# Patient Record
Sex: FEMALE | Race: WHITE | NOT HISPANIC OR LATINO | Employment: UNEMPLOYED | URBAN - METROPOLITAN AREA
[De-identification: names, ages, dates, MRNs, and addresses within clinical notes are randomized per-mention and may not be internally consistent; named-entity substitution may affect disease eponyms.]

---
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
## 2023-03-03 ENCOUNTER — OFFICE VISIT (OUTPATIENT)
Dept: FAMILY MEDICINE CLINIC | Facility: CLINIC | Age: 18
End: 2023-03-03
Payer: COMMERCIAL

## 2023-03-03 VITALS
SYSTOLIC BLOOD PRESSURE: 108 MMHG | HEART RATE: 71 BPM | BODY MASS INDEX: 26.68 KG/M2 | WEIGHT: 145 LBS | DIASTOLIC BLOOD PRESSURE: 65 MMHG | HEIGHT: 62 IN | TEMPERATURE: 98.1 F | OXYGEN SATURATION: 97 %

## 2023-03-03 DIAGNOSIS — Z00.00 ANNUAL PHYSICAL EXAM: Primary | ICD-10-CM

## 2023-03-03 DIAGNOSIS — F41.9 ANXIETY: ICD-10-CM

## 2023-03-03 DIAGNOSIS — F33.1 MODERATE EPISODE OF RECURRENT MAJOR DEPRESSIVE DISORDER: ICD-10-CM

## 2023-03-03 PROCEDURE — 99395 PREV VISIT EST AGE 18-39: CPT | Performed by: NURSE PRACTITIONER

## 2023-03-03 RX ORDER — ESCITALOPRAM OXALATE 10 MG/1
10 TABLET ORAL DAILY
Qty: 90 TABLET | Refills: 3 | Status: SHIPPED | OUTPATIENT
Start: 2023-03-03

## 2023-03-03 RX ORDER — BUSPIRONE HYDROCHLORIDE 5 MG/1
TABLET ORAL
Qty: 60 TABLET | Refills: 5 | Status: SHIPPED | OUTPATIENT
Start: 2023-03-03

## 2023-07-31 ENCOUNTER — TELEPHONE (OUTPATIENT)
Dept: FAMILY MEDICINE CLINIC | Facility: CLINIC | Age: 18
End: 2023-07-31
Payer: COMMERCIAL

## 2023-07-31 ENCOUNTER — PATIENT MESSAGE (OUTPATIENT)
Dept: FAMILY MEDICINE CLINIC | Facility: CLINIC | Age: 18
End: 2023-07-31
Payer: COMMERCIAL

## 2023-07-31 DIAGNOSIS — Z34.90 PREGNANCY, UNSPECIFIED GESTATIONAL AGE: Primary | ICD-10-CM

## 2023-08-04 ENCOUNTER — CLINICAL SUPPORT (OUTPATIENT)
Dept: FAMILY MEDICINE CLINIC | Facility: CLINIC | Age: 18
End: 2023-08-04
Payer: COMMERCIAL

## 2023-08-04 DIAGNOSIS — Z34.90 PREGNANCY, UNSPECIFIED GESTATIONAL AGE: ICD-10-CM

## 2023-08-04 DIAGNOSIS — Z11.59 NEED FOR HEPATITIS C SCREENING TEST: Primary | ICD-10-CM

## 2023-08-04 LAB
25(OH)D3 SERPL-MCNC: 27.9 NG/ML (ref 30–100)
ALBUMIN SERPL-MCNC: 4.8 G/DL (ref 3.5–5.2)
ALBUMIN/GLOB SERPL: 1.7 G/DL
ALP SERPL-CCNC: 44 U/L (ref 39–117)
ALT SERPL W P-5'-P-CCNC: 15 U/L (ref 1–33)
ANION GAP SERPL CALCULATED.3IONS-SCNC: 12 MMOL/L (ref 5–15)
AST SERPL-CCNC: 18 U/L (ref 1–32)
BASOPHILS # BLD AUTO: 0.05 10*3/MM3 (ref 0–0.2)
BASOPHILS NFR BLD AUTO: 0.6 % (ref 0–1.5)
BILIRUB SERPL-MCNC: 0.4 MG/DL (ref 0–1.2)
BUN SERPL-MCNC: 10 MG/DL (ref 6–20)
BUN/CREAT SERPL: 13 (ref 7–25)
CALCIUM SPEC-SCNC: 10.3 MG/DL (ref 8.6–10.5)
CHLORIDE SERPL-SCNC: 103 MMOL/L (ref 98–107)
CHOLEST SERPL-MCNC: 176 MG/DL (ref 0–200)
CO2 SERPL-SCNC: 25 MMOL/L (ref 22–29)
CREAT SERPL-MCNC: 0.77 MG/DL (ref 0.57–1)
DEPRECATED RDW RBC AUTO: 38.6 FL (ref 37–54)
EGFRCR SERPLBLD CKD-EPI 2021: 114.1 ML/MIN/1.73
EOSINOPHIL # BLD AUTO: 0.26 10*3/MM3 (ref 0–0.4)
EOSINOPHIL NFR BLD AUTO: 3.3 % (ref 0.3–6.2)
ERYTHROCYTE [DISTWIDTH] IN BLOOD BY AUTOMATED COUNT: 12 % (ref 12.3–15.4)
FOLATE SERPL-MCNC: 6.74 NG/ML (ref 4.78–24.2)
GLOBULIN UR ELPH-MCNC: 2.9 GM/DL
GLUCOSE SERPL-MCNC: 90 MG/DL (ref 65–99)
HCT VFR BLD AUTO: 43.7 % (ref 34–46.6)
HCV AB SER DONR QL: NORMAL
HDLC SERPL-MCNC: 54 MG/DL (ref 40–60)
HGB BLD-MCNC: 14.6 G/DL (ref 12–15.9)
IMM GRANULOCYTES # BLD AUTO: 0.02 10*3/MM3 (ref 0–0.05)
IMM GRANULOCYTES NFR BLD AUTO: 0.3 % (ref 0–0.5)
LDLC SERPL CALC-MCNC: 109 MG/DL (ref 0–100)
LDLC/HDLC SERPL: 2 {RATIO}
LYMPHOCYTES # BLD AUTO: 2.4 10*3/MM3 (ref 0.7–3.1)
LYMPHOCYTES NFR BLD AUTO: 30.1 % (ref 19.6–45.3)
MCH RBC QN AUTO: 29.7 PG (ref 26.6–33)
MCHC RBC AUTO-ENTMCNC: 33.4 G/DL (ref 31.5–35.7)
MCV RBC AUTO: 88.8 FL (ref 79–97)
MONOCYTES # BLD AUTO: 0.51 10*3/MM3 (ref 0.1–0.9)
MONOCYTES NFR BLD AUTO: 6.4 % (ref 5–12)
NEUTROPHILS NFR BLD AUTO: 4.73 10*3/MM3 (ref 1.7–7)
NEUTROPHILS NFR BLD AUTO: 59.3 % (ref 42.7–76)
NRBC BLD AUTO-RTO: 0 /100 WBC (ref 0–0.2)
PLATELET # BLD AUTO: 418 10*3/MM3 (ref 140–450)
PMV BLD AUTO: 10.1 FL (ref 6–12)
POTASSIUM SERPL-SCNC: 4.3 MMOL/L (ref 3.5–5.2)
PROT SERPL-MCNC: 7.7 G/DL (ref 6–8.5)
RBC # BLD AUTO: 4.92 10*6/MM3 (ref 3.77–5.28)
SODIUM SERPL-SCNC: 140 MMOL/L (ref 136–145)
TRIGL SERPL-MCNC: 69 MG/DL (ref 0–150)
TSH SERPL DL<=0.05 MIU/L-ACNC: 2.17 UIU/ML (ref 0.27–4.2)
VIT B12 BLD-MCNC: 433 PG/ML (ref 211–946)
VLDLC SERPL-MCNC: 13 MG/DL (ref 5–40)
WBC NRBC COR # BLD: 7.97 10*3/MM3 (ref 3.4–10.8)

## 2023-08-04 PROCEDURE — 84443 ASSAY THYROID STIM HORMONE: CPT | Performed by: NURSE PRACTITIONER

## 2023-08-04 PROCEDURE — 80053 COMPREHEN METABOLIC PANEL: CPT | Performed by: NURSE PRACTITIONER

## 2023-08-04 PROCEDURE — 82607 VITAMIN B-12: CPT | Performed by: NURSE PRACTITIONER

## 2023-08-04 PROCEDURE — 82306 VITAMIN D 25 HYDROXY: CPT | Performed by: NURSE PRACTITIONER

## 2023-08-04 PROCEDURE — 85025 COMPLETE CBC W/AUTO DIFF WBC: CPT | Performed by: NURSE PRACTITIONER

## 2023-08-04 PROCEDURE — 80061 LIPID PANEL: CPT | Performed by: NURSE PRACTITIONER

## 2023-08-04 PROCEDURE — 86803 HEPATITIS C AB TEST: CPT | Performed by: NURSE PRACTITIONER

## 2023-08-04 PROCEDURE — 82746 ASSAY OF FOLIC ACID SERUM: CPT | Performed by: NURSE PRACTITIONER

## 2024-06-06 ENCOUNTER — OFFICE VISIT (OUTPATIENT)
Dept: FAMILY MEDICINE CLINIC | Facility: CLINIC | Age: 19
End: 2024-06-06
Payer: COMMERCIAL

## 2024-06-06 VITALS
SYSTOLIC BLOOD PRESSURE: 109 MMHG | DIASTOLIC BLOOD PRESSURE: 72 MMHG | HEART RATE: 69 BPM | TEMPERATURE: 98 F | HEIGHT: 62 IN | BODY MASS INDEX: 29.15 KG/M2 | WEIGHT: 158.4 LBS | OXYGEN SATURATION: 98 %

## 2024-06-06 PROCEDURE — 99213 OFFICE O/P EST LOW 20 MIN: CPT | Performed by: NURSE PRACTITIONER

## 2024-06-06 NOTE — PROGRESS NOTES
Chief Complaint  Anxiety (depress) and Depression    Subjective          Vicki Jeong is a 18 y.o. female who presents to [facility]    Anxiety   Symptoms include nervous/anxious behavior.  Patient reports no chest pain, confusion, dizziness, nausea, palpitations, shortness of breath or suicidal ideas. Her past medical history is significant for depression.   DepressionSymptoms include nervousness/anxiety. Patient is not experiencing: confusion, palpitations, shortness of breath, suicidal ideas, chest pain, dizziness and nausea.  Her past medical history is significant for depression.     History of Present Illness  The patient presents for evaluation of depression.    The patient gave birth to her baby on 05/04/2024 and has been experiencing feelings of sadness since the conclusion of her pregnancy. She has previously responded well to Lexapro and BuSpar, but is uncertain about the appropriateness of continuing them while breastfeeding. Her feelings of sadness and anxiety are heightened due to her partner's lack of support and are planning to move in with in-laws. This has exacerbated her anxiety following the birth of her baby.  The patient's in-laws are continually encouraging supplementation for her baby with formula instead of breastfeeding, as the baby's lack of perceived weight gain has been a challenge.     She is employed at [employer] and is seeking a short-term claim for medication adjustment. Her maternity leave is set f until     06/14/2024. She denies any suicidal or homicidal ideation. She admits to occasional smoking, albeit less frequently than in the past. Her sleep pattern is disrupted, waking up 2 to 3 times per night with the baby  The patient reports a sensation of weakness in her neck.      PHQ-2 Total Score: 6   PHQ-9 Total Score: 6        Review of Systems   Respiratory:  Negative for shortness of breath.    Cardiovascular:  Negative for chest pain and  palpitations.   Gastrointestinal:  Negative for nausea.   Neurological:  Negative for dizziness.   Psychiatric/Behavioral:  Negative for confusion, self-injury, sleep disturbance and suicidal ideas. The patient is nervous/anxious.           Medical History: has a past medical history of Anxiety and Depression.     Surgical History: has a past surgical history that includes [place] tooth extraction (2022).     Family History: family history includes Cancer in her paternal grandfather; Depression in her mother.     Social History: reports that she has never smoked. She has never used smokeless tobacco.    Allergies: Amoxicillin-pot clavulanate and Cephalexin      Health Maintenance Due   Topic Date Due   • Pneumococcal Vaccine 0-64 (1 of 1 - PPSV23 or PCV20) 09/24/2009   • HPV VACCINES (2 - 2-dose series) 12/21/2017   • COVID-19 Vaccine (1 - 2023-24 season) Never done   • ANNUAL PHYSICAL  03/03/2024            Current Outpatient Medications:   •  albuterol sulfate  (90 Base) MCG/ACT inhaler, INHALE 1 TO 2 PUFFS BY MOUTH EVERY 4 TO 6 HOURS AS NEEDED, Disp: 6.7 g, Rfl: 1  •  busPIRone (BUSPAR) 5 MG tablet, qam and noon (Patient not taking: Reported on 6/6/2024), Disp: 60 tablet, Rfl: 5  •  norgestimate-ethinyl estradiol (Tri-Lo-Sprintec) 0.18/0.215/0.25 MG-25 MCG per tablet, Take 1 tablet by mouth Daily. (Patient not taking: Reported on 6/6/2024), Disp: 28 tablet, Rfl: 11  •  sertraline (Zoloft) 50 MG tablet, 0.5 tab po qhs x 7 days then 1 tab qhs thereafter., Disp: 30 tablet, Rfl: 1      Immunization History   Administered Date(s) Administered   • DTaP 2003, 01/27/2004, 03/23/2004, 08/09/2005, 11/19/2007   • DTaP / HiB / IPV 2003, 01/27/2004, 03/23/2004, 01/06/2005   • Hep A, 2 Dose 12/14/2018, 01/22/2020   • Hep B, Adolescent or Pediatric 2003, 2003, 07/13/2004   • Hpv9 06/21/2017   • IPV 2003, 01/27/2004, 08/09/2005, 11/19/2007   • MMR 10/13/2004, 11/19/2007   • Meningococcal  "Conjugate 10/13/2014   • Meningococcal MCV4P (Menactra) 01/22/2020   • Pneumococcal Conjugate 13-Valent (PCV13) 2003, 01/27/2004, 03/23/2004, 01/06/2005   • Tdap 10/13/2014   • Varicella 10/13/2004, 11/19/2007         Objective       Vitals:    06/06/24 1533   BP: 109/72   BP Location: Right arm   Patient Position: Sitting   Cuff Size: Adult   Pulse: 69   Temp: 98 °F (36.7 °C)   TempSrc: Temporal   SpO2: 98%   Weight: 71.8 kg (158 lb 6.4 oz)   Height: 157.5 cm (62\")      Body mass index is 28.97 kg/m².   Wt Readings from Last 3 Encounters:   06/06/24 71.8 kg (158 lb 6.4 oz)   03/03/23 65.8 kg (145 lb) (76%, Z= 0.71)*   03/02/22 58.1 kg (128 lb) (56%, Z= 0.15)*     * Growth percentiles are based on CDC (Girls, 2-20 Years) data.      BP Readings from Last 3 Encounters:   06/06/24 109/72   03/03/23 108/65   03/02/22 117/65        Facility age limit for growth %molina is 20 years.    Physical Exam  Vitals reviewed.   Constitutional:       Appearance: Normal appearance. She is well-developed.   HENT:      Head: Normocephalic and atraumatic.   Eyes:      Conjunctiva/sclera: Conjunctivae normal.      Pupils: Pupils are equal, round, and reactive to light.   Cardiovascular:      Rate and Rhythm: Normal rate and regular rhythm.      Heart sounds: Normal heart sounds. No murmur heard.  Pulmonary:      Effort: Pulmonary effort is normal.      Breath sounds: Normal breath sounds. No wheezing or rhonchi.   Abdominal:      General: Bowel sounds are normal. There is no distension.      Palpations: Abdomen is soft.      Tenderness: There is no abdominal tenderness.   Skin:     General: Skin is warm and dry.   Neurological:      Mental Status: She is alert and oriented to person, place, and time.   Psychiatric:         Mood and Affect: Mood and affect normal.         Behavior: Behavior normal.         Thought Content: Thought content normal.         Judgment: Judgment normal.       Physical Exam        Result Review : "   Results  Testing  Depression score is 6.       Common labs          3/7/2024    14:43 5/3/2024    18:30 5/5/2024    00:00   Common Labs   WBC 16.54     11.19        Hemoglobin 10.4     13.6     11.9       Hematocrit 32.4     40.5     36.7       Platelets 315     260           Details          This result is from an external source.                            Assessment and Plan        Diagnoses and all orders for this visit:    1. Post partum depression (Primary)  -     sertraline (Zoloft) 50 MG tablet; 0.5 tab po qhs x 7 days then 1 tab qhs thereafter.  Dispense: 30 tablet; Refill: 1  -     TSH Rfx On Abnormal To Free T4; Future       Patient was given an SSRI/SNRI medication and warned of possible side effects of the medication including potential for increased risk of suicidal thoughts and feelings.  Patient was instructed that if they begin to exhibit any of these effects that they will discontinue the medication immediately and contact our office or the ER ASAP.    Assessment & Plan  1. Depression.  The patient's depression score is recorded at 6, indicating moderate depression. The patient was informed that Lexapro is not the most suitable alternative for breastfeeding. A prescription for Zoloft, to be taken at bedtime, was issued, which is deemed safe in breastfeeding. A follow-up appointment is scheduled for 30 days hence to assess the patient's response to the Zoloft treatment. The patient was advised to report any suicidal or homicidal ideation while on Zoloft. Given the patient's history of postpartum anxiety/depression and her mother's history of hypothyroidism, a thyroid check was recommended.    Follow Up     Return in about 4 weeks (around 7/4/2024) for Next scheduled follow up.    Patient was given instructions and counseling regarding her condition or for health maintenance advice. Please see specific information pulled into the AVS if appropriate.     ANDREA Montiel    Patient or patient  representative verbalized consent for the use of Ambient Listening during the visit with  ANDREA Montiel for chart documentation. 6/6/2024  16:05 EDT

## 2024-06-25 NOTE — PROGRESS NOTES
Chief Complaint    Annual Exam  Annual Exam and Depression (Follow up)    Subjective          Vicki Jeong is a 18 y.o. female who presents to [facility]    Annual Exam    History of Present Illness  The patient presents for evaluation of multiple medical concerns.    She gave birth to a baby who weighed 6 pounds 13 ounces.   Her last dental exam was over a year ago and her last eye exam was last year.   During her last visit, she was prescribed Zoloft for anxiety, which she tolerates well. She reports that the medication has somewhat reduced her anxiety, but she continues to experience significant anxiety. She is seeking an additional month's leave from work.     She was prescribed Tri-Sprintec by her doctor, but her insurance did not cover it, leading her to switch to [employer]. The Tri-Sprintec was refilled by this provider. She is concerned about safety of taking Sprintec while breastfeeding. She has only menstruated once since giving birth due to stress related to moving in with her in laws with her new baby.      PHQ-2 Total Score:     PHQ-9 Total Score:          Review of Systems   Constitutional:  Negative for fever.   Respiratory:  Negative for chest tightness and shortness of breath.    Cardiovascular:  Negative for chest pain and palpitations.   Psychiatric/Behavioral:  The patient is nervous/anxious.           Medical History: has a past medical history of Anxiety and Depression.     Surgical History: has a past surgical history that includes [place] tooth extraction (2022).     Family History: family history includes Cancer in her paternal grandfather; Depression in her mother.     Social History: reports that she has never smoked. She has never used smokeless tobacco.    Allergies: Amoxicillin-pot clavulanate, Cephalexin, and Latex      Health Maintenance Due   Topic Date Due    HPV VACCINES (2 - 2-dose series) 12/21/2017    COVID-19 Vaccine (1 - 2023-24 season) Never  "done    ANNUAL PHYSICAL  03/03/2024            Current Outpatient Medications:     sertraline (Zoloft) 100 MG tablet, Take 1 tablet by mouth Daily. 0.5 tab po qhs x 7 days then 1 tab qhs thereafter., Disp: 90 tablet, Rfl: 1    norethindrone (Ortho Micronor) 0.35 MG tablet, Take 1 tablet by mouth Daily., Disp: 28 tablet, Rfl: 12      Immunization History   Administered Date(s) Administered    DTaP 2003, 01/27/2004, 03/23/2004, 08/09/2005, 11/19/2007    DTaP / HiB / IPV 2003, 01/27/2004, 03/23/2004, 01/06/2005    Hep A, 2 Dose 12/14/2018, 01/22/2020    Hep B, Adolescent or Pediatric 2003, 2003, 07/13/2004    Hpv9 06/21/2017    IPV 2003, 01/27/2004, 08/09/2005, 11/19/2007    MMR 10/13/2004, 11/19/2007    Meningococcal Conjugate 10/13/2014    Meningococcal MCV4P (Menactra) 01/22/2020    Pneumococcal Conjugate 13-Valent (PCV13) 2003, 01/27/2004, 03/23/2004, 01/06/2005    Tdap 10/13/2014    Varicella 10/13/2004, 11/19/2007         Objective       Vitals:    07/11/24 0801   BP: 103/71   Pulse: 91   Temp: 98.5 °F (36.9 °C)   TempSrc: Temporal   SpO2: 98%   Weight: 71.7 kg (158 lb)   Height: 157.5 cm (62.01\")      Body mass index is 28.89 kg/m².   Wt Readings from Last 3 Encounters:   07/11/24 71.7 kg (158 lb)   06/06/24 71.8 kg (158 lb 6.4 oz)   03/03/23 65.8 kg (145 lb) (76%, Z= 0.71)*     * Growth percentiles are based on [facility] (Girls, 2-20 Years) data.      BP Readings from Last 3 Encounters:   07/11/24 103/71   06/06/24 109/72   03/03/23 108/65                 Physical Exam  Vitals reviewed.   Constitutional:       Appearance: Normal appearance. She is well-developed.   HENT:      Head: Normocephalic and atraumatic.   Eyes:      Conjunctiva/sclera: Conjunctivae normal.      Pupils: Pupils are equal, round, and reactive to light.   Cardiovascular:      Rate and Rhythm: Normal rate and regular rhythm.      Heart sounds: Normal heart sounds. No murmur heard.  Pulmonary:      Effort: " Pulmonary effort is normal.      Breath sounds: Normal breath sounds. No wheezing or rhonchi.   Abdominal:      General: Bowel sounds are normal. There is no distension.      Palpations: Abdomen is soft.      Tenderness: There is no abdominal tenderness.   Skin:     General: Skin is warm and dry.   Neurological:      Mental Status: She is alert and oriented to person, place, and time.   Psychiatric:         Mood and Affect: Mood and affect normal.         Behavior: Behavior normal.         Thought Content: Thought content normal.         Judgment: Judgment normal.     Physical Exam        Result Review :       Common labs          3/7/2024    14:43 5/3/2024    18:30 5/5/2024    00:00   Common Labs   WBC 16.54     11.19        Hemoglobin 10.4     13.6     11.9       Hematocrit 32.4     40.5     36.7       Platelets 315     260           Details          This result is from an external source.             Results                   Assessment and Plan      Assessment & Plan  1. Anxiety.  The dosage of Zoloft will be increased to 100 mg daily. A 4-week leave from work will be initiated.    2. Birth control.  A minipill prescription will be sent to [facility]. It is recommended that she take the minipill consistently at the same time each day.    Follow-up  A follow-up appointment is scheduled for 4 weeks from now.    Diagnoses and all orders for this visit:    1. Annual physical exam (Primary)    2. Post partum depression  -     sertraline (Zoloft) 100 MG tablet; Take 1 tablet by mouth Daily. 0.5 tab po qhs x 7 days then 1 tab qhs thereafter.  Dispense: 90 tablet; Refill: 1    3. OCP (oral contraceptive pills) initiation  -     norethindrone (Ortho Micronor) 0.35 MG tablet; Take 1 tablet by mouth Daily.  Dispense: 28 tablet; Refill: 12    4. Breastfeeding (infant)  -     norethindrone (Ortho Micronor) 0.35 MG tablet; Take 1 tablet by mouth Daily.  Dispense: 28 tablet; Refill: 12      Pt will be off work until follow up  in 1 month.     Follow Up     Return in about 4 weeks (around 8/8/2024) for Next scheduled follow up.    Updated annual wellness visit checklist.  Immunizations discussed.  Screening discussed and/or ordered.  Recommend yearly dental and eye exams. Also discussed monitoring of blood pressure and lipids.      Patient was given instructions and counseling regarding her condition or for health maintenance advice. Please see specific information pulled into the AVS if appropriate.     Patient or patient representative verbalized consent for the use of Ambient Listening during the visit with  ANDREA Montiel for chart documentation. 7/11/2024  08:12 EDT    ANDREA Montiel

## 2024-07-11 ENCOUNTER — OFFICE VISIT (OUTPATIENT)
Dept: FAMILY MEDICINE CLINIC | Facility: CLINIC | Age: 19
End: 2024-07-11
Payer: COMMERCIAL

## 2024-07-11 VITALS
WEIGHT: 158 LBS | HEART RATE: 91 BPM | SYSTOLIC BLOOD PRESSURE: 103 MMHG | OXYGEN SATURATION: 98 % | TEMPERATURE: 98.5 F | BODY MASS INDEX: 29.08 KG/M2 | DIASTOLIC BLOOD PRESSURE: 71 MMHG | HEIGHT: 62 IN

## 2024-07-11 DIAGNOSIS — Z00.00 ANNUAL PHYSICAL EXAM: Primary | ICD-10-CM

## 2024-07-11 DIAGNOSIS — Z78.9 BREASTFEEDING (INFANT): ICD-10-CM

## 2024-07-11 DIAGNOSIS — Z30.011 OCP (ORAL CONTRACEPTIVE PILLS) INITIATION: ICD-10-CM

## 2024-07-11 PROCEDURE — 99395 PREV VISIT EST AGE 18-39: CPT | Performed by: NURSE PRACTITIONER

## 2024-07-11 RX ORDER — SERTRALINE HYDROCHLORIDE 100 MG/1
100 TABLET, FILM COATED ORAL DAILY
Qty: 90 TABLET | Refills: 1 | Status: SHIPPED | OUTPATIENT
Start: 2024-07-11

## 2024-07-11 RX ORDER — ACETAMINOPHEN AND CODEINE PHOSPHATE 120; 12 MG/5ML; MG/5ML
1 SOLUTION ORAL DAILY
Qty: 28 TABLET | Refills: 12 | Status: SHIPPED | OUTPATIENT
Start: 2024-07-11 | End: 2025-07-11

## 2024-07-11 NOTE — PATIENT INSTRUCTIONS
Generalized Anxiety Disorder, Adult  Generalized anxiety disorder (AD) is a mental health condition. Unlike normal worries, anxiety related to AD is not triggered by a specific event. These worries do not fade or get better with time. AD interferes with relationships, work, and school.  AD symptoms can vary from mild to severe. People with severe AD can have intense waves of anxiety with physical symptoms that are similar to panic attacks.  What are the causes?  The exact cause of AD is not known, but the following are believed to have an impact:  Differences in natural brain chemicals.  Genes passed down from parents to children.  Differences in the way threats are perceived.  Development and stress during childhood.  Personality.  What increases the risk?  The following factors may make you more likely to develop this condition:  Being female.  Having a family history of anxiety disorders.  Being very shy.  Experiencing very stressful life events, such as the death of a loved one.  Having a very stressful family environment.  What are the signs or symptoms?  People with AD often worry excessively about many things in their lives, such as their health and family. Symptoms may also include:  Mental and emotional symptoms:  Worrying excessively about natural disasters.  Fear of being late.  Difficulty concentrating.  Fears that others are judging your performance.  Physical symptoms:  Fatigue.  Headaches, muscle tension, muscle twitches, trembling, or feeling shaky.  Feeling like your heart is pounding or beating very fast.  Feeling out of breath or like you cannot take a deep breath.  Having trouble falling asleep or staying asleep, or experiencing restlessness.  Sweating.  Nausea, diarrhea, or irritable bowel syndrome (IBS).  Behavioral symptoms:  Experiencing erratic moods or irritability.  Avoidance of new situations.  Avoidance of people.  Extreme difficulty making decisions.  How is this diagnosed?  This  condition is diagnosed based on your symptoms and medical history. You will also have a physical exam. Your health care provider may perform tests to rule out other possible causes of your symptoms.  To be diagnosed with AD, a person must have anxiety that:  Is out of his or her control.  Affects several different aspects of his or her life, such as work and relationships.  Causes distress that makes him or her unable to take part in normal activities.  Includes at least three symptoms of AD, such as restlessness, fatigue, trouble concentrating, irritability, muscle tension, or sleep problems.  Before your health care provider can confirm a diagnosis of AD, these symptoms must be present more days than they are not, and they must last for 6 months or longer.  How is this treated?  This condition may be treated with:  Medicine. Antidepressant medicine is usually prescribed for long-term daily control. Anti-anxiety medicines may be added in severe cases, especially when panic attacks occur.  Talk therapy (psychotherapy). Certain types of talk therapy can be helpful in treating AD by providing support, education, and guidance. Options include:  Cognitive behavioral therapy (CBT). People learn coping skills and self-calming techniques to ease their physical symptoms. They learn to identify unrealistic thoughts and behaviors and to replace them with more appropriate thoughts and behaviors.  Acceptance and commitment therapy (ACT). This treatment teaches people how to be mindful as a way to cope with unwanted thoughts and feelings.  Biofeedback. This process trains you to manage your body's response (physiological response) through breathing techniques and relaxation methods. You will work with a therapist while machines are used to monitor your physical symptoms.  Stress management techniques. These include yoga, meditation, and exercise.  A mental health specialist can help determine which treatment is best for you.  Some people see improvement with one type of therapy. However, other people require a combination of therapies.  Follow these instructions at home:  Lifestyle  Maintain a consistent routine and schedule.  Anticipate stressful situations. Create a plan and allow extra time to work with your plan.  Practice stress management or self-calming techniques that you have learned from your therapist or your health care provider.  Exercise regularly and spend time outdoors.  Eat a healthy diet that includes plenty of vegetables, fruits, whole grains, low-fat dairy products, and lean protein.  Do not eat a lot of foods that are high in fat, added sugar, or salt (sodium).  Drink plenty of water.  Avoid alcohol. Alcohol can increase anxiety.  Avoid caffeine and certain over-the-counter cold medicines. These may make you feel worse. Ask your pharmacist which medicines to avoid.  General instructions  Take over-the-counter and prescription medicines only as told by your health care provider.  Understand that you are likely to have setbacks. Accept this and be kind to yourself as you persist to take better care of yourself.  Anticipate stressful situations. Create a plan and allow extra time to work with your plan.  Recognize and accept your accomplishments, even if you  them as small.  Spend time with people who care about you.  Keep all follow-up visits. This is important.  Where to find more information  National [place] of Mental Health: www.nimh.nih.gov  Substance Abuse and Mental Health Services: www.samhsa.gov  Contact a health care provider if:  Your symptoms do not get better.  Your symptoms get worse.  You have signs of depression, such as:  A persistently sad or irritable mood.  Loss of enjoyment in activities that used to bring you terence.  Change in weight or eating.  Changes in sleeping habits.  Get help right away if:  You have thoughts about hurting yourself or others.  If you ever feel like you may hurt  yourself or others, or have thoughts about taking your own life, get help right away. Go to your nearest emergency department or:  Call your local emergency services (731 in the [place]  Call a suicide crisis helpline, such as the National Suicide Prevention Lifeline at 1-184.934.2993 or 059 in the [place] This is open 24 hours a day in the [place]  Text the Crisis Text Line at 880943 (in the [place]  Summary  Generalized anxiety disorder (AD) is a mental health condition that involves worry that is not triggered by a specific event.  People with AD often worry excessively about many things in their lives, such as their health and family.  AD may cause symptoms such as restlessness, trouble concentrating, sleep problems, frequent sweating, nausea, diarrhea, headaches, and trembling or muscle twitching.  A mental health specialist can help determine which treatment is best for you. Some people see improvement with one type of therapy. However, other people require a combination of therapies.  This information is not intended to replace advice given to you by your health care provider. Make sure you discuss any questions you have with your health care provider.  Document Revised: 07/13/2022 Document Reviewed: 04/10/2022  Elsevier Patient Education © 2024 [employer]

## 2024-08-16 NOTE — PROGRESS NOTES
Chief Complaint  Follow-up (Patient follow-up on Birth Control)    Alli Jeong is a 18 y.o. female who presents to [facility]    History of Present Illness  History of Present Illness  The patient presents for evaluation of multiple medical concerns.    She continues her Zoloft regimen, which has been effective in managing her anxiety. She also reports a weight loss of 3 pounds, attributing it to regular walking.    She is on a daily birth control regimen and denies any instances of breakthrough bleeding. However, she does experience occasional cramping but this is manageable.     She has discontinued breastfeeding and has transitioned her baby to formula. She is seeking advice on safe methods to cease breast milk production.      PHQ-2 Total Score:     PHQ-9 Total Score:          There are no preventive care reminders to display for this patient.       Review of Systems   Constitutional:  Negative for chills, fatigue and fever.   Respiratory:  Negative for cough and shortness of breath.    Cardiovascular:  Negative for chest pain and palpitations.   Gastrointestinal:  Negative for constipation, diarrhea, nausea and vomiting.   Musculoskeletal:  Negative for back pain and neck pain.   Skin:  Negative for rash.   Neurological:  Negative for dizziness and headaches.   Psychiatric/Behavioral:  The patient is nervous/anxious.           Medical History: has a past medical history of Anxiety and Depression.     Surgical History: has a past surgical history that includes [place] tooth extraction (2022).     Family History: family history includes Cancer in her paternal grandfather; Depression in her mother.     Social History: reports that she has never smoked. She has never used smokeless tobacco.    Allergies: Amoxicillin-pot clavulanate, Cephalexin, and Latex      Current Outpatient Medications:     ibuprofen (ADVIL,MOTRIN) 800 MG tablet, Take 1 tablet by mouth  "Every 8 (Eight) Hours As Needed., Disp: , Rfl:     norethindrone (Ortho Micronor) 0.35 MG tablet, Take 1 tablet by mouth Daily., Disp: 28 tablet, Rfl: 12    sertraline (Zoloft) 100 MG tablet, Take 1 tablet by mouth Daily. 0.5 tab po qhs x 7 days then 1 tab qhs thereafter., Disp: 90 tablet, Rfl: 1      Immunization History   Administered Date(s) Administered    DTaP 2003, 01/27/2004, 03/23/2004, 08/09/2005, 11/19/2007    DTaP / HiB / IPV 2003, 01/27/2004, 03/23/2004, 01/06/2005    Hep A, 2 Dose 12/14/2018, 01/22/2020    Hep B, Adolescent or Pediatric 2003, 2003, 07/13/2004    Hpv9 06/21/2017    IPV 2003, 01/27/2004, 08/09/2005, 11/19/2007    MMR 10/13/2004, 11/19/2007    Meningococcal Conjugate 10/13/2014    Meningococcal MCV4P (Menactra) 01/22/2020    Pneumococcal Conjugate 13-Valent (PCV13) 2003, 01/27/2004, 03/23/2004, 01/06/2005    Rho (D) Immune Globulin 02/15/2024, 05/05/2024    Tdap 10/13/2014, 02/15/2024    Varicella 10/13/2004, 11/19/2007         Objective       Vitals:    08/22/24 0835   BP: 120/78   BP Location: Right arm   Patient Position: Sitting   Cuff Size: Adult   Pulse: 89   Temp: 97.6 °F (36.4 °C)   TempSrc: Temporal   SpO2: 98%   Weight: 70.6 kg (155 lb 9.6 oz)   Height: 157.5 cm (62\")      Body mass index is 28.46 kg/m².   Wt Readings from Last 3 Encounters:   08/22/24 70.6 kg (155 lb 9.6 oz)   07/11/24 71.7 kg (158 lb)   06/06/24 71.8 kg (158 lb 6.4 oz)      BP Readings from Last 3 Encounters:   08/22/24 120/78   07/11/24 103/71   06/06/24 109/72        Facility age limit for growth %molina is 20 years.    Physical Exam  Vitals reviewed.   Constitutional:       Appearance: Normal appearance. She is well-developed.   HENT:      Head: Normocephalic and atraumatic.   Eyes:      Conjunctiva/sclera: Conjunctivae normal.      Pupils: Pupils are equal, round, and reactive to light.   Cardiovascular:      Rate and Rhythm: Normal rate and regular rhythm.      Heart " sounds: Normal heart sounds. No murmur heard.  Pulmonary:      Effort: Pulmonary effort is normal.      Breath sounds: Normal breath sounds. No wheezing or rhonchi.   Abdominal:      General: Bowel sounds are normal. There is no distension.      Palpations: Abdomen is soft.      Tenderness: There is no abdominal tenderness.   Skin:     General: Skin is warm and dry.   Neurological:      Mental Status: She is alert and oriented to person, place, and time.   Psychiatric:         Mood and Affect: Mood and affect normal.         Behavior: Behavior normal.         Thought Content: Thought content normal.         Judgment: Judgment normal.         Physical Exam        Result Review :   Results         Common labs          3/7/2024    14:43 5/3/2024    18:30 5/5/2024    00:00   Common Labs   WBC 16.54     11.19        Hemoglobin 10.4     13.6     11.9       Hematocrit 32.4     40.5     36.7       Platelets 315     260           Details          This result is from an external source.                            Assessment and Plan        Diagnoses and all orders for this visit:    1. Anxiety (Primary)  Assessment & Plan:  Continue zoloft as pt anxiety is improved with med.       2. Encounter for surveillance of contraceptive pills    Continue ocp as pt is regulated and doing well.     Assessment & Plan  1. Anxiety.  Her anxiety is well-managed with sertraline (Zoloft), as evidenced by her positive response to the medication. A 90-day supply of sertraline was provided during her last visit in July 2024. She is advised to continue her current dosage.    2. Contraception.  She has been adhering to her birth control regimen without any reported issues. A prescription for Micronor, a 1-pack with 12 refills, was sent to [facility].     3. Breast Milk Suppression.  She inquired about safely drying up breast milk. She is advised to tightly wrap her breasts with an Ace bandage and avoid direct hot water contact during  showers. Over-the-counter allergy medications such as Claritin, Zyrtec, or Allegra were recommended to aid in drying up the breast milk.    Follow-up  A follow-up appointment is scheduled for 6 months from now.    Follow Up     Return in about 6 months (around 2/22/2025) for Next scheduled follow up.    Patient was given instructions and counseling regarding her condition or for health maintenance advice. Please see specific information pulled into the AVS if appropriate.     ANDREA Montiel    Patient or patient representative verbalized consent for the use of Ambient Listening during the visit with  ANDREA Montiel for chart documentation. 8/22/2024  08:56 EDT

## 2024-08-22 ENCOUNTER — OFFICE VISIT (OUTPATIENT)
Dept: FAMILY MEDICINE CLINIC | Facility: CLINIC | Age: 19
End: 2024-08-22
Payer: COMMERCIAL

## 2024-08-22 VITALS
HEIGHT: 62 IN | SYSTOLIC BLOOD PRESSURE: 120 MMHG | DIASTOLIC BLOOD PRESSURE: 78 MMHG | HEART RATE: 89 BPM | OXYGEN SATURATION: 98 % | TEMPERATURE: 97.6 F | BODY MASS INDEX: 28.63 KG/M2 | WEIGHT: 155.6 LBS

## 2024-08-22 DIAGNOSIS — F41.9 ANXIETY: Primary | ICD-10-CM

## 2024-08-22 DIAGNOSIS — Z30.41 ENCOUNTER FOR SURVEILLANCE OF CONTRACEPTIVE PILLS: ICD-10-CM

## 2024-08-22 PROCEDURE — 99213 OFFICE O/P EST LOW 20 MIN: CPT | Performed by: NURSE PRACTITIONER

## 2024-08-22 RX ORDER — IBUPROFEN 800 MG/1
800 TABLET ORAL EVERY 8 HOURS PRN
COMMUNITY
Start: 2024-05-05

## 2024-08-22 NOTE — PATIENT INSTRUCTIONS
Generalized Anxiety Disorder, Adult  Generalized anxiety disorder (AD) is a mental health condition. Unlike normal worries, anxiety related to AD is not triggered by a specific event. These worries do not fade or get better with time. AD interferes with relationships, work, and school.  AD symptoms can vary from mild to severe. People with severe AD can have intense waves of anxiety with physical symptoms that are similar to panic attacks.  What are the causes?  The exact cause of AD is not known, but the following are believed to have an impact:  Differences in natural brain chemicals.  Genes passed down from parents to children.  Differences in the way threats are perceived.  Development and stress during childhood.  Personality.  What increases the risk?  The following factors may make you more likely to develop this condition:  Being female.  Having a family history of anxiety disorders.  Being very shy.  Experiencing very stressful life events, such as the death of a loved one.  Having a very stressful family environment.  What are the signs or symptoms?  People with AD often worry excessively about many things in their lives, such as their health and family. Symptoms may also include:  Mental and emotional symptoms:  Worrying excessively about natural disasters.  Fear of being late.  Difficulty concentrating.  Fears that others are judging your performance.  Physical symptoms:  Fatigue.  Headaches, muscle tension, muscle twitches, trembling, or feeling shaky.  Feeling like your heart is pounding or beating very fast.  Feeling out of breath or like you cannot take a deep breath.  Having trouble falling asleep or staying asleep, or experiencing restlessness.  Sweating.  Nausea, diarrhea, or irritable bowel syndrome (IBS).  Behavioral symptoms:  Experiencing erratic moods or irritability.  Avoidance of new situations.  Avoidance of people.  Extreme difficulty making decisions.  How is this diagnosed?  This  condition is diagnosed based on your symptoms and medical history. You will also have a physical exam. Your health care provider may perform tests to rule out other possible causes of your symptoms.  To be diagnosed with AD, a person must have anxiety that:  Is out of his or her control.  Affects several different aspects of his or her life, such as work and relationships.  Causes distress that makes him or her unable to take part in normal activities.  Includes at least three symptoms of AD, such as restlessness, fatigue, trouble concentrating, irritability, muscle tension, or sleep problems.  Before your health care provider can confirm a diagnosis of AD, these symptoms must be present more days than they are not, and they must last for 6 months or longer.  How is this treated?  This condition may be treated with:  Medicine. Antidepressant medicine is usually prescribed for long-term daily control. Anti-anxiety medicines may be added in severe cases, especially when panic attacks occur.  Talk therapy (psychotherapy). Certain types of talk therapy can be helpful in treating AD by providing support, education, and guidance. Options include:  Cognitive behavioral therapy (CBT). People learn coping skills and self-calming techniques to ease their physical symptoms. They learn to identify unrealistic thoughts and behaviors and to replace them with more appropriate thoughts and behaviors.  Acceptance and commitment therapy (ACT). This treatment teaches people how to be mindful as a way to cope with unwanted thoughts and feelings.  Biofeedback. This process trains you to manage your body's response (physiological response) through breathing techniques and relaxation methods. You will work with a therapist while machines are used to monitor your physical symptoms.  Stress management techniques. These include yoga, meditation, and exercise.  A mental health specialist can help determine which treatment is best for you.  Some people see improvement with one type of therapy. However, other people require a combination of therapies.  Follow these instructions at home:  Lifestyle  Maintain a consistent routine and schedule.  Anticipate stressful situations. Create a plan and allow extra time to work with your plan.  Practice stress management or self-calming techniques that you have learned from your therapist or your health care provider.  Exercise regularly and spend time outdoors.  Eat a healthy diet that includes plenty of vegetables, fruits, whole grains, low-fat dairy products, and lean protein.  Do not eat a lot of foods that are high in fat, added sugar, or salt (sodium).  Drink plenty of water.  Avoid alcohol. Alcohol can increase anxiety.  Avoid caffeine and certain over-the-counter cold medicines. These may make you feel worse. Ask your pharmacist which medicines to avoid.  General instructions  Take over-the-counter and prescription medicines only as told by your health care provider.  Understand that you are likely to have setbacks. Accept this and be kind to yourself as you persist to take better care of yourself.  Anticipate stressful situations. Create a plan and allow extra time to work with your plan.  Recognize and accept your accomplishments, even if you  them as small.  Spend time with people who care about you.  Keep all follow-up visits. This is important.  Where to find more information  [place] [employer]: www.nimh.nih.gov  Substance Abuse and Mental Health Services: www.samhsa.gov  Contact a health care provider if:  Your symptoms do not get better.  Your symptoms get worse.  You have signs of depression, such as:  A persistently sad or irritable mood.  Loss of enjoyment in activities that used to bring you terence.  Change in weight or eating.  Changes in sleeping habits.  Get help right away if:  You have thoughts about hurting yourself or others.  If you ever feel like you may hurt  yourself or others, or have thoughts about taking your own life, get help right away. Go to your nearest emergency department or:  Call your local emergency services (221 in the [place]  Call a suicide crisis helpline, such as the National Suicide Prevention Lifeline at 1-706.455.6617 or 592 in the [place] This is open 24 hours a day in the [place]  Text the Crisis Text Line at 001404 (in the [place]  Summary  Generalized anxiety disorder (AD) is a mental health condition that involves worry that is not triggered by a specific event.  People with AD often worry excessively about many things in their lives, such as their health and family.  AD may cause symptoms such as restlessness, trouble concentrating, sleep problems, frequent sweating, nausea, diarrhea, headaches, and trembling or muscle twitching.  A mental health specialist can help determine which treatment is best for you. Some people see improvement with one type of therapy. However, other people require a combination of therapies.  This information is not intended to replace advice given to you by your health care provider. Make sure you discuss any questions you have with your health care provider.  Document Revised: 07/13/2022 Document Reviewed: 04/10/2022  [employer] Patient Education © 2024 [employer]